# Patient Record
Sex: FEMALE | Race: WHITE
[De-identification: names, ages, dates, MRNs, and addresses within clinical notes are randomized per-mention and may not be internally consistent; named-entity substitution may affect disease eponyms.]

---

## 2019-04-05 ENCOUNTER — HOSPITAL ENCOUNTER (EMERGENCY)
Dept: HOSPITAL 80 - FED | Age: 24
Discharge: HOME | End: 2019-04-05
Payer: COMMERCIAL

## 2019-04-05 VITALS — SYSTOLIC BLOOD PRESSURE: 112 MMHG | DIASTOLIC BLOOD PRESSURE: 70 MMHG

## 2019-04-05 DIAGNOSIS — S82.302D: Primary | ICD-10-CM

## 2019-04-05 DIAGNOSIS — S82.402D: ICD-10-CM
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
## 2019-04-05 LAB — PLATELET # BLD: 388 10^3/UL (ref 150–400)

## 2019-04-05 NOTE — EDPHY
HPI/HX/ROS/PE/MDM


Narrative: 





CHIEF COMPLAINT: Leg pain/swelling s/p surgery 2/2/19





HPI:    


This patient is a generally healthy 23 year old female who underwent surgical 

correction for a tib/fib fracture on February 2 2019. She has been doing PT 

since 2/11 and generally feeling well. Earlier this week, she developed a new, 

"deeper bone pain" in her shin, particularly when she steps down. This pain 

occasionally radiates up to her knee or down to her ankle. Her physical 

therapist referred her to an urgent care clinic, who subsequently directed her 

to present to the ED for further evaluation. She endorses some mild swelling 

and warmth to tibial lucina. No fever, chest pain, shortness of breath, calf pain 

or swelling, or other associated symptoms.  








REVIEW OF SYSTEMS:


A comprehensive 10 system review of systems is otherwise negative aside from 

elements mentioned in the history of present illness and medical decision 

making.





PMH: ORIF tib/fib 2/2/19.





SOCIAL HISTORY: Student. Friend at bedside. Does not abuse tobacco, drugs, or 

alcohol. 





PHYSICAL EXAM:


General:Patient is alert, in no acute distress.


ENT:Eyes are normal to inspection.  ENT inspection normal.


Neck: Normal inspection.  Full range of motion.


Respiratory:No respiratory distress.  Breath sounds normal bilaterally.


Cardiovascular: Regular rate and rhythm.  Strong peripheral pulses.  Normal cap 

refill.


Abdomen:The abdomen is nontender to palpation. There are no peritoneal signs. 

There are normal bowel sounds.


Back: Normal to inspection.  No tenderness to palpation.


Skin: Normal color.  No rash.  Warm and dry.


Extremities: Left leg: Well-healed surgical incisions. Tenderness to bone over 

fracture site. Otherwise normal appearance of extremities.  Full range of 

motion.


Neuro: Oriented x3.  Normal motor function.  Normal sensory function.


 


ED Course: 





22 y/o female s/p ORIF left tib/fib fracture 2/2/19 presents with left leg pain 

and concern for DVT. Plan for US LLE for further evaluation. 





17:47 Spoke with Dr. Tyson. US negative for DVT. 





Reviewed tibia/fibula x-ray shows healing subacute tibial and fibular fractures 

in anatomic alignment. No evidence of osteomyelitis. 





Laboratory studies completely unremarkable, no indication for systemic 

infection at this time. Patient is afebrile, well-appearing. No evidence of 

infection to surgical sites. 





18:30 Reassessed patient. Discussed imaigng and laboratory results. Plan to 

discharge home in good condition. Follow up and return precautions discussed. 

She will follow up with her orthopedic surgeon. She is comfortable with this 

plan. 





- Data Points


Imaging Results: 


 Imaging Impressions





Extremity Venous Study  04/05/19 17:13


Impression: There is no sonographic evidence of deep or superficial vein 

thrombosis in the left lower extremity.


 


Findings were discussed with Thong Nam MD at 17:46, on 4/5/2019.


 








Tibia/Fibula X-Ray  04/05/19 17:54


Impression:


1. Healing subacute tibial and fibular fractures in anatomic alignment.


2. No radiographic evidence of osteomyelitis.


 











Imaging: I viewed and interpreted images myself


Laboratory Results: 


 Laboratory Results





 04/05/19 18:06 





 04/05/19 18:06 





 











  04/05/19 04/05/19





  18:06 18:06


 


WBC    9.15 10^3/uL 10^3/uL





    (3.80-9.50) 


 


RBC    4.63 10^6/uL 10^6/uL





    (4.18-5.33) 


 


Hgb    14.2 g/dL g/dL





    (12.6-16.3) 


 


Hct    43.2 % %





    (38.0-47.0) 


 


MCV    93.3 fL fL





    (81.5-99.8) 


 


MCH    30.7 pg pg





    (27.9-34.1) 


 


MCHC    32.9 g/dL g/dL





    (32.4-36.7) 


 


RDW    12.5 % %





    (11.5-15.2) 


 


Plt Count    388 10^3/uL 10^3/uL





    (150-400) 


 


MPV    9.7 fL fL





    (8.7-11.7) 


 


Neut % (Auto)    54.3 % %





    (39.3-74.2) 


 


Lymph % (Auto)    37.2 % %





    (15.0-45.0) 


 


Mono % (Auto)    5.8 % %





    (4.5-13.0) 


 


Eos % (Auto)    2.1 % %





    (0.6-7.6) 


 


Baso % (Auto)    0.4 % %





    (0.3-1.7) 


 


Nucleat RBC Rel Count    0.0 % %





    (0.0-0.2) 


 


Absolute Neuts (auto)    4.97 10^3/uL 10^3/uL





    (1.70-6.50) 


 


Absolute Lymphs (auto)    3.40 10^3/uL H 10^3/uL





    (1.00-3.00) 


 


Absolute Monos (auto)    0.53 10^3/uL 10^3/uL





    (0.30-0.80) 


 


Absolute Eos (auto)    0.19 10^3/uL 10^3/uL





    (0.03-0.40) 


 


Absolute Basos (auto)    0.04 10^3/uL 10^3/uL





    (0.02-0.10) 


 


Absolute Nucleated RBC    0.00 10^3/uL 10^3/uL





    (0-0.01) 


 


Immature Gran %    0.2 % %





    (0.0-1.1) 


 


Immature Gran #    0.02 10^3/uL 10^3/uL





    (0.00-0.10) 


 


Sodium  136 mEq/L mEq/L  





   (135-145)  


 


Potassium  4.3 mEq/L mEq/L  





   (3.5-5.2)  


 


Chloride  100 mEq/L mEq/L  





   ()  


 


Carbon Dioxide  24 mEq/l mEq/l  





   (22-31)  


 


Anion Gap  12 mEq/L mEq/L  





   (6-14)  


 


BUN  19 mg/dL mg/dL  





   (7-23)  


 


Creatinine  0.8 mg/dL mg/dL  





   (0.6-1.0)  


 


Estimated GFR  > 60   





   


 


Glucose  81 mg/dL mg/dL  





   ()  


 


Calcium  10.2 mg/dL mg/dL  





   (8.5-10.4)  














General


Time Seen by Provider: 04/05/19 16:50


Initial Vital Signs: 


 Initial Vital Signs











Temperature (C)  36.6 C   04/05/19 16:48


 


Heart Rate  88   04/05/19 16:48


 


Respiratory Rate  16   04/05/19 16:48


 


Blood Pressure  131/80 H  04/05/19 16:48


 


O2 Sat (%)  99   04/05/19 16:48








 











O2 Delivery Mode               Room Air














Allergies/Adverse Reactions: 


 





No Known Allergies Allergy (Unverified 04/05/19 16:52)


 








Home Medications: 














 Medication  Instructions  Recorded


 


Adderall 10 mg Tablet  04/05/19


 


Advil  04/05/19














Departure





- Departure


Disposition: Home, Routine, Self-Care


Clinical Impression: 


 Leg pain, left





Condition: Good


Instructions:  Leg Pain (ED)


Additional Instructions: 


Follow up with your surgeon for further evaluation. 





Return to the emergency department for worsening pain, swelling, numbness, 

weakness, fever, or other concerns. 


Referrals: 


[facility] [Other] - As per Instructions


Report Scribed for: Thong Nam


Report Scribed by: Court Zapien


Date of Report: 04/05/19


Time of Report: 18:46


Physician Review and Approval Statement: Portions of this note were transcribed 

by an ED scribe.  I personally performed the history, physical exam, and 

medical decision making; and confirm the accuracy of the information in the 

transcribed note.